# Patient Record
Sex: FEMALE | Race: WHITE | URBAN - METROPOLITAN AREA
[De-identification: names, ages, dates, MRNs, and addresses within clinical notes are randomized per-mention and may not be internally consistent; named-entity substitution may affect disease eponyms.]

---

## 2021-07-16 ENCOUNTER — NEW PATIENT ROUTINE/VISION (OUTPATIENT)
Dept: URBAN - METROPOLITAN AREA CLINIC 53 | Facility: CLINIC | Age: 71
End: 2021-07-16

## 2021-07-16 DIAGNOSIS — Z79.899: ICD-10-CM

## 2021-07-16 DIAGNOSIS — Z01.00: ICD-10-CM

## 2021-07-16 DIAGNOSIS — H52.4: ICD-10-CM

## 2021-07-16 PROCEDURE — 92134 CPTRZ OPH DX IMG PST SGM RTA: CPT

## 2021-07-16 PROCEDURE — 92004 COMPRE OPH EXAM NEW PT 1/>: CPT

## 2021-07-16 PROCEDURE — 92015 DETERMINE REFRACTIVE STATE: CPT

## 2021-07-16 ASSESSMENT — KERATOMETRY
OS_AXISANGLE2_DEGREES: 17
OD_AXISANGLE_DEGREES: 046
OD_K1POWER_DIOPTERS: 43.75
OS_K1POWER_DIOPTERS: 44.00
OS_AXISANGLE_DEGREES: 107
OS_K2POWER_DIOPTERS: 43.25
OD_K2POWER_DIOPTERS: 43.00
OD_AXISANGLE2_DEGREES: 136

## 2021-07-16 ASSESSMENT — VISUAL ACUITY
OD_CC: 20/20-1
OU_CC: J1+
OS_CC: 20/25-1
OS_SC: 20/150
OD_SC: 20/200
OU_CC: 20/20

## 2021-07-16 ASSESSMENT — TONOMETRY
OD_IOP_MMHG: 16
OS_IOP_MMHG: 16

## 2021-07-16 NOTE — PATIENT DISCUSSION
Discussed with patient no evidence of retinal toxicity noted. Visual field and macular OCT are within normal limits. Will continue to monitor yearly and will send report to managing physician.

## 2021-07-16 NOTE — PATIENT DISCUSSION
She has had the lesion on the left lower lid for many years. Will obtain an external photo and check on it annually. She denies any itching, scaling or bleeding.

## 2022-08-02 ENCOUNTER — COMPREHENSIVE EXAM (OUTPATIENT)
Dept: URBAN - METROPOLITAN AREA CLINIC 52 | Facility: CLINIC | Age: 72
End: 2022-08-02

## 2022-08-02 DIAGNOSIS — Z79.899: ICD-10-CM

## 2022-08-02 DIAGNOSIS — H52.4: ICD-10-CM

## 2022-08-02 PROCEDURE — 92134 CPTRZ OPH DX IMG PST SGM RTA: CPT

## 2022-08-02 PROCEDURE — 92014 COMPRE OPH EXAM EST PT 1/>: CPT

## 2022-08-02 PROCEDURE — 92015 DETERMINE REFRACTIVE STATE: CPT

## 2022-08-02 ASSESSMENT — VISUAL ACUITY
OU_CC: J1+
OD_GLARE: 20/60
OS_PH: 20/25
OS_CC: 20/30-1
OS_GLARE: 20/60
OS_GLARE: 20/50
OD_GLARE: 20/40
OD_CC: 20/20

## 2022-08-02 ASSESSMENT — TONOMETRY
OD_IOP_MMHG: 14
OS_IOP_MMHG: 14

## 2022-08-02 NOTE — PATIENT DISCUSSION
Discussed with patient no evidence of retinal toxicity noted. Mac OCT was normal. Will order HVF next available and send letter to PCP.

## 2022-08-10 ENCOUNTER — DIAGNOSTICS ONLY (OUTPATIENT)
Dept: URBAN - METROPOLITAN AREA CLINIC 52 | Facility: CLINIC | Age: 72
End: 2022-08-10

## 2022-08-10 DIAGNOSIS — Z79.899: ICD-10-CM

## 2022-08-10 PROCEDURE — 92082 INTERMEDIATE VISUAL FIELD XM: CPT

## 2022-08-10 PROCEDURE — 92134 CPTRZ OPH DX IMG PST SGM RTA: CPT

## 2022-08-10 NOTE — PATIENT DISCUSSION
After reviewing testing, patient appears to have retinal toxicity. Recommend patient to discontinue plaquenil. Will send letter to PCP.

## 2023-08-16 ENCOUNTER — COMPREHENSIVE EXAM (OUTPATIENT)
Dept: URBAN - METROPOLITAN AREA CLINIC 53 | Facility: CLINIC | Age: 73
End: 2023-08-16

## 2023-08-16 DIAGNOSIS — H43.813: ICD-10-CM

## 2023-08-16 DIAGNOSIS — H25.13: ICD-10-CM

## 2023-08-16 DIAGNOSIS — Z79.899: ICD-10-CM

## 2023-08-16 DIAGNOSIS — D49.2: ICD-10-CM

## 2023-08-16 PROCEDURE — 92134 CPTRZ OPH DX IMG PST SGM RTA: CPT

## 2023-08-16 PROCEDURE — 92014 COMPRE OPH EXAM EST PT 1/>: CPT

## 2023-08-16 ASSESSMENT — VISUAL ACUITY
OU_CC: J1+
OS_GLARE: 20/20
OS_GLARE: 20/20
OD_GLARE: 20/20
OU_CC: 20/20
OS_CC: 20/20
OD_GLARE: 20/20
OD_CC: 20/20

## 2023-08-16 ASSESSMENT — TONOMETRY
OS_IOP_MMHG: 13
OD_IOP_MMHG: 13

## 2023-08-18 ENCOUNTER — DIAGNOSTICS ONLY (OUTPATIENT)
Dept: URBAN - METROPOLITAN AREA CLINIC 53 | Facility: CLINIC | Age: 73
End: 2023-08-18

## 2023-08-18 DIAGNOSIS — Z79.899: ICD-10-CM

## 2023-08-18 PROCEDURE — 92083 EXTENDED VISUAL FIELD XM: CPT

## 2023-08-18 PROCEDURE — 92134 CPTRZ OPH DX IMG PST SGM RTA: CPT

## 2024-05-06 ENCOUNTER — EMERGENCY VISIT (OUTPATIENT)
Dept: URBAN - METROPOLITAN AREA CLINIC 52 | Facility: CLINIC | Age: 74
End: 2024-05-06

## 2024-05-06 DIAGNOSIS — H43.813: ICD-10-CM

## 2024-05-06 DIAGNOSIS — H04.123: ICD-10-CM

## 2024-05-06 DIAGNOSIS — Z79.899: ICD-10-CM

## 2024-05-06 DIAGNOSIS — H25.13: ICD-10-CM

## 2024-05-06 PROCEDURE — 99214 OFFICE O/P EST MOD 30 MIN: CPT

## 2024-05-06 PROCEDURE — 92134 CPTRZ OPH DX IMG PST SGM RTA: CPT

## 2024-05-06 ASSESSMENT — TONOMETRY
OD_IOP_MMHG: 13
OS_IOP_MMHG: 13

## 2024-05-06 ASSESSMENT — VISUAL ACUITY
OS_CC: 20/25
OD_CC: 20/20
OU_CC: J1+
OU_CC: 20/20

## 2024-08-19 ENCOUNTER — COMPREHENSIVE EXAM (OUTPATIENT)
Dept: URBAN - METROPOLITAN AREA CLINIC 52 | Facility: CLINIC | Age: 74
End: 2024-08-19

## 2024-08-19 DIAGNOSIS — H52.4: ICD-10-CM

## 2024-08-19 DIAGNOSIS — H25.13: ICD-10-CM

## 2024-08-19 DIAGNOSIS — D49.2: ICD-10-CM

## 2024-08-19 DIAGNOSIS — H43.813: ICD-10-CM

## 2024-08-19 DIAGNOSIS — Z79.899: ICD-10-CM

## 2024-08-19 DIAGNOSIS — H04.123: ICD-10-CM

## 2024-08-19 PROCEDURE — 92083 EXTENDED VISUAL FIELD XM: CPT

## 2024-08-19 PROCEDURE — 92134 CPTRZ OPH DX IMG PST SGM RTA: CPT

## 2024-08-19 PROCEDURE — 99214 OFFICE O/P EST MOD 30 MIN: CPT

## 2024-08-19 PROCEDURE — 92015 DETERMINE REFRACTIVE STATE: CPT

## 2024-08-19 ASSESSMENT — TONOMETRY
OD_IOP_MMHG: 11
OS_IOP_MMHG: 11

## 2024-08-19 ASSESSMENT — VISUAL ACUITY
OD_GLARE: 20/30
OU_CC: 20/20
OS_CC: 20/25
OS_GLARE: 20/30
OS_GLARE: 20/40
OU_CC: J1+@16"
OD_GLARE: 20/40
OD_CC: 20/20-2

## 2025-02-24 ENCOUNTER — COMPREHENSIVE EXAM (OUTPATIENT)
Age: 75
End: 2025-02-24

## 2025-02-24 DIAGNOSIS — H04.123: ICD-10-CM

## 2025-02-24 DIAGNOSIS — H52.4: ICD-10-CM

## 2025-02-24 DIAGNOSIS — H43.813: ICD-10-CM

## 2025-02-24 DIAGNOSIS — Z79.899: ICD-10-CM

## 2025-02-24 DIAGNOSIS — D48.1: ICD-10-CM

## 2025-02-24 DIAGNOSIS — H25.13: ICD-10-CM

## 2025-02-24 PROCEDURE — 99214 OFFICE O/P EST MOD 30 MIN: CPT

## 2025-02-24 PROCEDURE — 92134 CPTRZ OPH DX IMG PST SGM RTA: CPT

## 2025-02-24 PROCEDURE — 92015 DETERMINE REFRACTIVE STATE: CPT
